# Patient Record
Sex: MALE | Race: WHITE | NOT HISPANIC OR LATINO | URBAN - NONMETROPOLITAN AREA
[De-identification: names, ages, dates, MRNs, and addresses within clinical notes are randomized per-mention and may not be internally consistent; named-entity substitution may affect disease eponyms.]

---

## 2017-01-13 ENCOUNTER — OFFICE VISIT (OUTPATIENT)
Dept: INTERNAL MEDICINE | Facility: CLINIC | Age: 62
End: 2017-01-13

## 2017-01-13 VITALS
HEART RATE: 75 BPM | BODY MASS INDEX: 31.1 KG/M2 | HEIGHT: 72 IN | WEIGHT: 229.6 LBS | SYSTOLIC BLOOD PRESSURE: 120 MMHG | OXYGEN SATURATION: 96 % | DIASTOLIC BLOOD PRESSURE: 80 MMHG

## 2017-01-13 DIAGNOSIS — E55.9 VITAMIN D DEFICIENCY: ICD-10-CM

## 2017-01-13 DIAGNOSIS — I49.3 PVC (PREMATURE VENTRICULAR CONTRACTION): ICD-10-CM

## 2017-01-13 DIAGNOSIS — E07.9 DISORDER OF THYROID: ICD-10-CM

## 2017-01-13 DIAGNOSIS — K12.2 ORAL ABSCESS: ICD-10-CM

## 2017-01-13 DIAGNOSIS — E11.9 TYPE 2 DIABETES MELLITUS WITHOUT COMPLICATION, WITHOUT LONG-TERM CURRENT USE OF INSULIN (HCC): ICD-10-CM

## 2017-01-13 DIAGNOSIS — I10 ESSENTIAL HYPERTENSION: ICD-10-CM

## 2017-01-13 DIAGNOSIS — I49.8 VENTRICULAR BIGEMINY: ICD-10-CM

## 2017-01-13 DIAGNOSIS — E78.2 MIXED HYPERLIPIDEMIA: Primary | ICD-10-CM

## 2017-01-13 LAB
25(OH)D3 SERPL-MCNC: 64.7 NG/ML
ALBUMIN SERPL-MCNC: 4.3 G/DL (ref 3.2–4.8)
ALBUMIN/GLOB SERPL: 1.3 G/DL (ref 1.5–2.5)
ALP SERPL-CCNC: 108 U/L (ref 25–100)
ALT SERPL W P-5'-P-CCNC: 32 U/L (ref 7–40)
ANION GAP SERPL CALCULATED.3IONS-SCNC: 11 MMOL/L (ref 3–11)
ARTICHOKE IGE QN: 147 MG/DL (ref 0–130)
AST SERPL-CCNC: 24 U/L (ref 0–33)
BILIRUB SERPL-MCNC: 0.5 MG/DL (ref 0.3–1.2)
BUN BLD-MCNC: 16 MG/DL (ref 9–23)
BUN/CREAT SERPL: 17.8 (ref 7–25)
CALCIUM SPEC-SCNC: 10.1 MG/DL (ref 8.7–10.4)
CHLORIDE SERPL-SCNC: 99 MMOL/L (ref 99–109)
CHOLEST SERPL-MCNC: 208 MG/DL (ref 0–200)
CO2 SERPL-SCNC: 32 MMOL/L (ref 20–31)
CREAT BLD-MCNC: 0.9 MG/DL (ref 0.6–1.3)
GFR SERPL CREATININE-BSD FRML MDRD: 86 ML/MIN/1.73
GLOBULIN UR ELPH-MCNC: 3.2 GM/DL
GLUCOSE BLD-MCNC: 116 MG/DL (ref 70–100)
HBA1C MFR BLD: 6.7 %
HDLC SERPL-MCNC: 33 MG/DL (ref 40–60)
POTASSIUM BLD-SCNC: 4.9 MMOL/L (ref 3.5–5.5)
PROT SERPL-MCNC: 7.5 G/DL (ref 5.7–8.2)
SODIUM BLD-SCNC: 142 MMOL/L (ref 132–146)
T4 FREE SERPL-MCNC: 0.97 NG/DL (ref 0.89–1.76)
TRIGL SERPL-MCNC: 124 MG/DL (ref 0–150)
TSH SERPL DL<=0.05 MIU/L-ACNC: 4.11 MIU/ML (ref 0.35–5.35)

## 2017-01-13 PROCEDURE — 83036 HEMOGLOBIN GLYCOSYLATED A1C: CPT | Performed by: FAMILY MEDICINE

## 2017-01-13 PROCEDURE — 99214 OFFICE O/P EST MOD 30 MIN: CPT | Performed by: FAMILY MEDICINE

## 2017-01-13 PROCEDURE — 84439 ASSAY OF FREE THYROXINE: CPT | Performed by: FAMILY MEDICINE

## 2017-01-13 PROCEDURE — 80053 COMPREHEN METABOLIC PANEL: CPT | Performed by: FAMILY MEDICINE

## 2017-01-13 PROCEDURE — 80061 LIPID PANEL: CPT | Performed by: FAMILY MEDICINE

## 2017-01-13 PROCEDURE — 36415 COLL VENOUS BLD VENIPUNCTURE: CPT | Performed by: FAMILY MEDICINE

## 2017-01-13 PROCEDURE — 84443 ASSAY THYROID STIM HORMONE: CPT | Performed by: FAMILY MEDICINE

## 2017-01-13 PROCEDURE — 82306 VITAMIN D 25 HYDROXY: CPT | Performed by: FAMILY MEDICINE

## 2017-01-13 RX ORDER — LEVOTHYROXINE SODIUM 137 UG/1
137 TABLET ORAL DAILY
Qty: 90 TABLET | Refills: 3 | Status: SHIPPED | OUTPATIENT
Start: 2017-01-13

## 2017-01-13 RX ORDER — VALSARTAN AND HYDROCHLOROTHIAZIDE 320; 25 MG/1; MG/1
1 TABLET, FILM COATED ORAL DAILY
Qty: 90 TABLET | Refills: 3 | Status: SHIPPED | OUTPATIENT
Start: 2017-01-13 | End: 2018-01-13

## 2017-01-13 RX ORDER — LANCETS
EACH MISCELLANEOUS
COMMUNITY
Start: 2016-11-11

## 2017-01-13 RX ORDER — METOPROLOL SUCCINATE 50 MG/1
50 TABLET, EXTENDED RELEASE ORAL DAILY
Qty: 90 TABLET | Refills: 3 | Status: SHIPPED | OUTPATIENT
Start: 2017-01-13

## 2017-01-13 RX ORDER — VALSARTAN AND HYDROCHLOROTHIAZIDE 160; 12.5 MG/1; MG/1
1 TABLET, FILM COATED ORAL DAILY
Qty: 90 TABLET | Refills: 3 | Status: SHIPPED | OUTPATIENT
Start: 2017-01-13 | End: 2017-01-13 | Stop reason: DRUGHIGH

## 2017-01-13 RX ORDER — DOXYCYCLINE 100 MG/1
100 TABLET ORAL 2 TIMES DAILY
Qty: 60 TABLET | Refills: 1 | Status: SHIPPED | OUTPATIENT
Start: 2017-01-13 | End: 2017-01-18

## 2017-01-13 RX ORDER — AMLODIPINE BESYLATE 10 MG/1
10 TABLET ORAL DAILY
Qty: 90 TABLET | Refills: 3 | Status: SHIPPED | OUTPATIENT
Start: 2017-01-13

## 2017-01-13 RX ORDER — METFORMIN HYDROCHLORIDE EXTENDED-RELEASE TABLETS 500 MG/1
500 TABLET, FILM COATED, EXTENDED RELEASE ORAL
Qty: 90 TABLET | Refills: 3 | Status: SHIPPED | OUTPATIENT
Start: 2017-01-13

## 2017-01-13 NOTE — PROGRESS NOTES
Subjective   Patricio Osorio is a 61 y.o. male.     History of Present Illness   Patricio presents today for fu on HTN, HLD, Vit D def, hypothyroidism, and DM2.  BP is fine on current regimen.  We need to get bi-annual labs on lipids and thyroid levels.  LFT's need rechecked, as well as vitamin D monitoring.  He is moving to [place], and this will be the last time we see him.  With his history of elevated lft's, he's not crazy about being on statin medications.    His A1C was 6.4 last visit and is 6.7 today.  He needs to change the rx of valsartan back to 320/25 from the 160/12.5.  He is already taking it, but needs rx.      He thinks he broke a tooth off on the left lower back teeth.  He had a large knot that's painful and obvious on exam.  He is going to get his teeth pulled and get a lower plate when he gets to [place].  He would like an abx to help clear the abscess for now until he can get to a dentist in [place].      The following portions of the patient's history were reviewed and updated as appropriate: allergies, current medications, past social history and problem list.    Review of Systems   Constitutional: Negative for appetite change, chills, fatigue, fever and unexpected weight change.   HENT: Negative for congestion, ear pain, hearing loss, nosebleeds, postnasal drip, rhinorrhea, sore throat, tinnitus and trouble swallowing.         Left lower jaw abscess.   Eyes: Negative for photophobia, discharge and visual disturbance.   Respiratory: Negative for cough, chest tightness, shortness of breath and wheezing.    Cardiovascular: Negative for chest pain, palpitations and leg swelling.   Gastrointestinal: Negative for abdominal distention, abdominal pain, blood in stool, constipation, diarrhea, nausea and vomiting.   Endocrine: Negative for cold intolerance, heat intolerance, polydipsia, polyphagia and polyuria.   Musculoskeletal: Negative for arthralgias, back pain, joint swelling, myalgias, neck pain  "and neck stiffness.   Skin: Negative for color change, pallor, rash and wound.   Allergic/Immunologic: Negative for environmental allergies, food allergies and immunocompromised state.   Neurological: Negative for dizziness, tremors, seizures, weakness, numbness and headaches.   Hematological: Negative for adenopathy. Does not bruise/bleed easily.   Psychiatric/Behavioral: Negative for agitation, behavioral problems, confusion, hallucinations, self-injury and suicidal ideas. The patient is not nervous/anxious.        Objective   Visit Vitals   • /80   • Pulse 75   • Ht 72\" (182.9 cm)   • Wt 229 lb 9.6 oz (104 kg)   • SpO2 96%   • BMI 31.14 kg/m2     Physical Exam   Constitutional: He is oriented to person, place, and time. He appears well-developed and well-nourished. No distress.   HENT:   Head: Normocephalic and atraumatic.   Right Ear: External ear normal.   Left Ear: External ear normal.   Nose: Nose normal.   Mouth/Throat: Oropharynx is clear and moist.   Erythema and edema with tenderness noted.   Eyes: Conjunctivae and EOM are normal. Pupils are equal, round, and reactive to light. Right eye exhibits no discharge. Left eye exhibits no discharge. No scleral icterus.   Neck: Normal range of motion. Neck supple. No JVD present. No tracheal deviation present. No thyromegaly present.   Cardiovascular: Normal rate, regular rhythm, normal heart sounds and intact distal pulses.  Exam reveals no gallop and no friction rub.    No murmur heard.  Pulmonary/Chest: Effort normal and breath sounds normal. No stridor. No respiratory distress. He has no wheezes. He has no rales. He exhibits no tenderness.   Abdominal: Soft. Bowel sounds are normal. He exhibits no distension and no mass. There is no tenderness. There is no rebound and no guarding. No hernia.   Musculoskeletal: Normal range of motion. He exhibits no edema, tenderness or deformity.   Lymphadenopathy:     He has no cervical adenopathy.   Neurological: He is " alert and oriented to person, place, and time. He has normal reflexes. He displays normal reflexes. No cranial nerve deficit. He exhibits normal muscle tone.   Skin: Skin is warm and dry. No rash noted. No erythema. No pallor.   Psychiatric: He has a normal mood and affect. His behavior is normal. Judgment normal.       Assessment/Plan   Problem List Items Addressed This Visit        Cardiovascular and Mediastinum    Hyperlipidemia - Primary    Relevant Orders    Lipid Panel    Comprehensive Metabolic Panel    T4, Free    TSH    Vitamin D 25 Hydroxy    Hypertension    Relevant Medications    amLODIPine (NORVASC) 10 MG tablet    metoprolol succinate XL (TOPROL-XL) 50 MG 24 hr tablet    valsartan-hydrochlorothiazide (DIOVAN-HCT) 320-25 MG per tablet    Other Relevant Orders    Lipid Panel    Comprehensive Metabolic Panel    T4, Free    TSH    Vitamin D 25 Hydroxy    Ventricular bigeminy    Relevant Medications    amLODIPine (NORVASC) 10 MG tablet    metoprolol succinate XL (TOPROL-XL) 50 MG 24 hr tablet    Other Relevant Orders    Lipid Panel    Comprehensive Metabolic Panel    T4, Free    TSH    Vitamin D 25 Hydroxy    PVC (premature ventricular contraction)    Relevant Medications    amLODIPine (NORVASC) 10 MG tablet    metoprolol succinate XL (TOPROL-XL) 50 MG 24 hr tablet    Other Relevant Orders    Lipid Panel    Comprehensive Metabolic Panel    T4, Free    TSH    Vitamin D 25 Hydroxy       Digestive    Vitamin D deficiency    Relevant Orders    Lipid Panel    Comprehensive Metabolic Panel    T4, Free    TSH    Vitamin D 25 Hydroxy       Endocrine    Disorder of thyroid    Relevant Medications    levothyroxine (SYNTHROID, LEVOTHROID) 137 MCG tablet    metoprolol succinate XL (TOPROL-XL) 50 MG 24 hr tablet    Other Relevant Orders    Lipid Panel    Comprehensive Metabolic Panel    T4, Free    TSH    Vitamin D 25 Hydroxy    DM2 (diabetes mellitus, type 2)    Relevant Medications    metFORMIN (FORTAMET) 500 MG (OSM)  24 hr tablet    Other Relevant Orders    Lipid Panel    Comprehensive Metabolic Panel    T4, Free    TSH    Vitamin D 25 Hydroxy    POC Glycosylated Hemoglobin (Hb A1C) (Completed)       Other    Oral abscess    Relevant Medications    doxycycline (ADOXA) 100 MG tablet           Get labs. FU PRN.  Call if needed over the next 6 months.

## 2017-01-13 NOTE — MR AVS SNAPSHOT
Patricio Osorio   1/13/2017 9:00 AM   Office Visit    Provider:  Aubrey Olsen DO   Department:  [facility]   Dept Phone:  [phone]                Your Full Care Plan              Today's Medication Changes          These changes are accurate as of: 1/13/17 10:24 AM.  If you have any questions, ask your nurse or doctor.               New Medication(s)Ordered:     doxycycline 100 MG tablet   Commonly known as:  ADOXA   Take 1 tablet by mouth 2 (Two) Times a Day for 30 days.   Started by:  Aubrey Olsen DO       valsartan-hydrochlorothiazide 320-25 MG per tablet   Commonly known as:  DIOVAN-HCT   Take 1 tablet by mouth Daily.   Replaces:  valsartan-hydrochlorothiazide 160-12.5 MG per tablet   Started by:  Aubrey Olsen DO         Stop taking medication(s)listed here:     valsartan-hydrochlorothiazide 160-12.5 MG per tablet   Commonly known as:  DIOVAN HCT   Replaced by:  valsartan-hydrochlorothiazide 320-25 MG per tablet   Stopped by:  Aubrey Olsen DO                Where to Get Your Medications      These medications were sent to [facility] [address] - [phone] [facility] [phone] [address] [address]     Phone:  [phone]     amLODIPine 10 MG tablet    levothyroxine 137 MCG tablet    metFORMIN 500 MG (OSM) 24 hr tablet    metoprolol succinate XL 50 MG 24 hr tablet    valsartan-hydrochlorothiazide 320-25 MG per tablet         These medications were sent to [facility] [address] [place] [address] [phone] [facility] [phone] [facility]  [address] [place] [id number]     Phone:  [phone]     doxycycline 100 MG tablet                  Your Updated Medication List          This list is accurate as of: 1/13/17 10:24 AM.  Always use your most recent med list.                ACCU-CHEK VICTOR M PLUS W/DEVICE kit   TEST TWICE DAILY FOR DIABETES       accu-chek softclix lancets   Use twice daily as directed for diabetes       ACCU-CHEK SOFTCLIX LANCETS lancets       amLODIPine 10 MG tablet   Commonly known as:  NORVASC   Take 1 tablet by mouth Daily.       ASPIRIN LOW DOSE 81 MG EC tablet   Generic drug:  aspirin       Cholecalciferol 31419 UNITS capsule       doxycycline 100 MG tablet   Commonly known as:  ADOXA   Take 1 tablet by mouth 2 (Two) Times a Day for 30 days.       fish oil 1000 MG capsule capsule       Garlic 100 MG tablet       glucose blood test strip   Commonly known as:  ACCU-CHEK VICTOR M PLUS   Use twice daily as instructed for diabetes       levothyroxine 137 MCG tablet   Commonly known as:  SYNTHROID, LEVOTHROID   Take 1 tablet by mouth Daily.       metFORMIN 500 MG (OSM) 24 hr tablet   Commonly known as:  FORTAMET   Take 1 tablet by mouth Daily With Breakfast.       metoprolol succinate XL 50 MG 24 hr tablet   Commonly known as:  TOPROL-XL   Take 1 tablet by mouth Daily.       Saw Palmetto (Serenoa repens) 320 MG capsule       valsartan-hydrochlorothiazide 320-25 MG per tablet   Commonly known as:  DIOVAN-HCT   Take 1 tablet by mouth Daily.       vitamin D 89767 UNITS capsule capsule   Commonly known as:  ERGOCALCIFEROL               We Performed the Following     Comprehensive Metabolic Panel     Lipid Panel     POC Glycosylated Hemoglobin (Hb A1C)     T4, Free     TSH     Vitamin D 25 Hydroxy       You Were Diagnosed With        Codes Comments    Mixed hyperlipidemia    -  Primary ICD-10-CM: E78.2  ICD-9-CM: 272.2     Essential hypertension     ICD-10-CM: I10  ICD-9-CM: 401.9     Disorder of thyroid     ICD-10-CM: E07.9  ICD-9-CM: 246.9     Type 2 diabetes mellitus without complication, without long-term current use of insulin     ICD-10-CM: E11.9  ICD-9-CM: 250.00     PVC (premature ventricular contraction)     ICD-10-CM: I49.3  ICD-9-CM: 427.69     Ventricular bigeminy     ICD-10-CM: I49.9  ICD-9-CM: 427.89     Vitamin D deficiency      "ICD-10-CM: E55.9  ICD-9-CM: 268.9     Oral abscess     ICD-10-CM: K12.2  ICD-9-CM: 528.3       Instructions     None    Patient Instructions History      MyChart Signup     [facility] [email or web address] allows you to send messages to your doctor, view your test results, renew your prescriptions, schedule appointments, and more. To sign up, go to [employer] and click on the Sign Up Now link in the New User? box. Enter your [email or web address] Activation Code exactly as it appears below along with the last four digits of your Social Security Number and your Date of Birth () to complete the sign-up process. If you do not sign up before the expiration date, you must request a new code.    [email or web address] Activation Code: [email or web address]  Expires: 2017 10:23 AM    If you have questions, you can email [email or web address] or call [phone] to talk to our [email or web address] staff. Remember, [email or web address] is NOT to be used for urgent needs. For medical emergencies, dial 911.               Other Info from Your Visit           Your Appointments     2017 11:00 AM EST   Follow Up with Grey Red DO   [facility] (--)    [address] [place] [address] [address] [place] [id number] [phone]           Arrive 15 minutes prior to appointment.              Allergies     No Known Allergies      Reason for Visit     Hypertension MOVING TO [place] - LAST VISIT WITH US       Vital Signs     Blood Pressure Pulse Height Weight Oxygen Saturation Body Mass Index    120/80 75 72\" (182.9 cm) 229 lb 9.6 oz (104 kg) 96% 31.14 kg/m2    Smoking Status                   Current Some Day Smoker           Problems and Diagnoses Noted     Disorder of thyroid gland    Type 2 diabetes    High cholesterol or triglycerides    High blood pressure    Oral abscess    Irregular heartbeat    Ventricular bigeminy    Vitamin D deficiency      Results     POC Glycosylated Hemoglobin (Hb A1C)      Component Value Standard " Range & Units    Hemoglobin A1C 6.7 %

## 2017-01-17 ENCOUNTER — TELEPHONE (OUTPATIENT)
Dept: INTERNAL MEDICINE | Facility: CLINIC | Age: 62
End: 2017-01-17

## 2017-01-17 DIAGNOSIS — L02.91 ABSCESS: ICD-10-CM

## 2017-01-17 NOTE — TELEPHONE ENCOUNTER
----- Message from Donna Anne sent at 1/17/2017  1:19 PM EST -----  Contact: Patient's Wife  Patient's wife is requesting that Dr. Olsen call the patient concerning an abscess. She states that it is very important that Dr. Olsen call her .

## 2017-01-18 DIAGNOSIS — L02.91 ABSCESS: Primary | ICD-10-CM

## 2017-01-18 RX ORDER — CEFUROXIME AXETIL 500 MG/1
500 TABLET ORAL 2 TIMES DAILY
Qty: 60 TABLET | Refills: 1 | Status: SHIPPED | OUTPATIENT
Start: 2017-01-18 | End: 2017-01-18 | Stop reason: SDUPTHER

## 2017-01-18 RX ORDER — CEFUROXIME AXETIL 500 MG/1
500 TABLET ORAL 2 TIMES DAILY
Qty: 60 TABLET | Refills: 1 | Status: SHIPPED | OUTPATIENT
Start: 2017-01-18

## 2017-06-14 DIAGNOSIS — Z79.899 ENCOUNTER FOR LONG-TERM CURRENT USE OF MEDICATION: ICD-10-CM

## 2017-06-14 DIAGNOSIS — I10 ESSENTIAL HYPERTENSION: ICD-10-CM

## 2017-06-14 DIAGNOSIS — E11.9 TYPE 2 DIABETES MELLITUS WITHOUT COMPLICATION (HCC): ICD-10-CM

## 2017-06-14 DIAGNOSIS — I49.8 VENTRICULAR BIGEMINY: ICD-10-CM

## 2017-06-14 DIAGNOSIS — I49.3 PVC (PREMATURE VENTRICULAR CONTRACTION): ICD-10-CM

## 2017-06-14 RX ORDER — METFORMIN HYDROCHLORIDE EXTENDED-RELEASE TABLETS 500 MG/1
TABLET, FILM COATED, EXTENDED RELEASE ORAL
Qty: 90 TABLET | Refills: 2 | Status: SHIPPED | OUTPATIENT
Start: 2017-06-14

## 2017-06-14 RX ORDER — ASPIRIN 81 MG
TABLET, DELAYED RELEASE (ENTERIC COATED) ORAL
Qty: 90 TABLET | Refills: 2 | Status: SHIPPED | OUTPATIENT
Start: 2017-06-14

## 2017-06-14 RX ORDER — METOPROLOL SUCCINATE 50 MG/1
TABLET, EXTENDED RELEASE ORAL
Qty: 90 TABLET | Refills: 2 | Status: SHIPPED | OUTPATIENT
Start: 2017-06-14